# Patient Record
Sex: MALE | Race: WHITE
[De-identification: names, ages, dates, MRNs, and addresses within clinical notes are randomized per-mention and may not be internally consistent; named-entity substitution may affect disease eponyms.]

---

## 2018-05-21 ENCOUNTER — HOSPITAL ENCOUNTER (OUTPATIENT)
Dept: HOSPITAL 47 - RADUSMAIN | Age: 58
Discharge: HOME | End: 2018-05-21
Payer: MEDICARE

## 2018-05-21 DIAGNOSIS — I48.91: Primary | ICD-10-CM

## 2018-05-21 PROCEDURE — 93880 EXTRACRANIAL BILAT STUDY: CPT

## 2018-05-21 NOTE — US
EXAMINATION TYPE: US carotid duplex BILAT

 

DATE OF EXAM: 5/21/2018

 

COMPARISON: NONE

 

CLINICAL HISTORY: A Fib I48.91. Pt states recent vision changes

 

EXAM MEASUREMENTS: 

 

RIGHT:  Peak Systolic Velocity (PSV) cm/sec

----- Right CCA:  54.9

----- Right ICA:  101.6

----- Right ECA:  103.4

ICA/CCA ratio:  1.9  

 

RIGHT:  End Diastole cm/sec

----- Right CCA:  12.1

----- Right ICA:  37.0   

----- Right ECA:  0.0   

 

LEFT:  Peak Systolic Velocity (PSV) cm/sec

----- Left CCA:  71.4

----- Left ICA:  74.6

----- Left ECA:  76.8

ICA/CCA ratio:  1.0

 

LEFT:  End Diastole cm/sec

----- Left CCA:  14.7

----- Left ICA:  28.5

----- Left ECA:  9.8

 

VERTEBRALS (direction of flow):

Right Vertebral: Antegrade

Left Vertebral: Antegrade

 

Rhythm:  Arrhythmia

 

No significant stenosis seen

 

**Results called to Dr. Martinez at time of exam**

 

IMPRESSION:  There is antegrade flow in the vertebral arteries. The images and measurements suggest l
ess than 25% stenosis in both internal carotid arteries.   

 

 

Criteria for Assigning % of Stenosis / Diameter reduction

(Estimation based on the indirect measurements of the internal carotid artery velocities (ICA PSV).

1.  Normal (no stenosis)=ICA PSV < 125 cm/s: ratio < 2.0: ICA EDV<40 cm/s.

2. Less than 50% stenosis=ICA PSV < 125 cm/s: ratio < 2.0: ICA EDV<40 cm/s.

3.  50 to 69% stenosis=ICA PSV of 125 to 230 cm/s: ration 2.0 ? 4.0: ICA EDV  cm/s.

4.  Greater than 70% stenosis to near occlusion= ICA PSV > 230 cm/s: ratio > 4.0: ICA EDV > 100 cm/s.
 

5.  Near occlusion= ICA PSV velocities may be low or undetectable: variable ratio and ICA EDV.

6.  Total occlusion=unable to detect flow.

## 2018-05-22 ENCOUNTER — HOSPITAL ENCOUNTER (OUTPATIENT)
Dept: HOSPITAL 47 - RADCTMAIN | Age: 58
Discharge: HOME | End: 2018-05-22
Payer: MEDICARE

## 2018-05-22 DIAGNOSIS — I63.9: ICD-10-CM

## 2018-05-22 DIAGNOSIS — E11.9: ICD-10-CM

## 2018-05-22 DIAGNOSIS — H53.2: Primary | ICD-10-CM

## 2018-05-22 PROCEDURE — 70450 CT HEAD/BRAIN W/O DYE: CPT

## 2018-05-22 NOTE — CT
EXAMINATION TYPE: CT brain wo con

 

DATE OF EXAM: 5/22/2018

 

COMPARISON: NONE

 

HISTORY: Double vision.

 

CT DLP: 986.4 mGycm

Automated exposure control for dose reduction was used.

 

FINDINGS: 

Ventricles of normal size. There is no mass effect nor midline shift. There is no sign of intracrania
l hemorrhage. The calvarium is intact.

 

IMPRESSION: 

NEGATIVE CT SCAN OF THE BRAIN. MINIMAL ETHMOID SINUSITIS NOTED. NO EVIDENCE OF ORBITAL MASS.

## 2018-10-05 ENCOUNTER — HOSPITAL ENCOUNTER (OUTPATIENT)
Dept: HOSPITAL 47 - RADPROMAIN | Age: 58
Discharge: HOME | End: 2018-10-05
Payer: MEDICARE

## 2018-10-05 VITALS
SYSTOLIC BLOOD PRESSURE: 135 MMHG | DIASTOLIC BLOOD PRESSURE: 72 MMHG | TEMPERATURE: 98.3 F | HEART RATE: 71 BPM | RESPIRATION RATE: 18 BRPM

## 2018-10-05 DIAGNOSIS — K43.9: Primary | ICD-10-CM

## 2018-10-05 DIAGNOSIS — Z53.8: ICD-10-CM

## 2018-10-05 DIAGNOSIS — T79.2XXA: ICD-10-CM

## 2018-10-05 PROCEDURE — 76536 US EXAM OF HEAD AND NECK: CPT

## 2018-10-05 NOTE — US
Discontinued abdominal wall aspiration

 

HISTORY: Abnormal CT, T79.2XXA, draining wound

 

Real-time ultrasound performed at the site of patient's draining tract. Correlation to CT scan from [facility] dated 7/30/2018

 

CT shows a large ventral abdominal wall hernia. Just inferiorly to the abdominal wall hernia, ultraso
und scanning was performed which shows a small sinus tract as noted on CT.

 

Following discussion of the risks and benefits of procedure, patient has elected not to undergo aspir
ation of the small sinus tract.

 

IMPRESSION: Sinus tract may be related to patient's stated history of infected abdominal wall mesh. C
onsider general surgery consult for abdominal wall hernia and sinus tract noted on CT.